# Patient Record
Sex: MALE | Race: WHITE | NOT HISPANIC OR LATINO | Employment: FULL TIME | URBAN - METROPOLITAN AREA
[De-identification: names, ages, dates, MRNs, and addresses within clinical notes are randomized per-mention and may not be internally consistent; named-entity substitution may affect disease eponyms.]

---

## 2019-02-19 ENCOUNTER — OFFICE VISIT (OUTPATIENT)
Dept: INTERNAL MEDICINE | Facility: CLINIC | Age: 54
End: 2019-02-19
Payer: COMMERCIAL

## 2019-02-19 VITALS
BODY MASS INDEX: 24.36 KG/M2 | WEIGHT: 174 LBS | SYSTOLIC BLOOD PRESSURE: 116 MMHG | OXYGEN SATURATION: 98 % | RESPIRATION RATE: 14 BRPM | HEART RATE: 70 BPM | DIASTOLIC BLOOD PRESSURE: 80 MMHG | HEIGHT: 71 IN | TEMPERATURE: 97.8 F

## 2019-02-19 DIAGNOSIS — Z00.00 ENCOUNTER FOR ROUTINE ADULT HEALTH EXAMINATION WITHOUT ABNORMAL FINDINGS: Primary | ICD-10-CM

## 2019-02-19 DIAGNOSIS — Z23 NEED FOR TDAP VACCINATION: ICD-10-CM

## 2019-02-19 DIAGNOSIS — Z12.5 SCREENING FOR PROSTATE CANCER: ICD-10-CM

## 2019-02-19 DIAGNOSIS — Z11.4 SCREENING FOR HIV (HUMAN IMMUNODEFICIENCY VIRUS): ICD-10-CM

## 2019-02-19 DIAGNOSIS — Z12.11 SCREEN FOR COLON CANCER: ICD-10-CM

## 2019-02-19 DIAGNOSIS — Z13.6 CARDIOVASCULAR SCREENING; LDL GOAL LESS THAN 160: ICD-10-CM

## 2019-02-19 PROCEDURE — 90715 TDAP VACCINE 7 YRS/> IM: CPT | Performed by: INTERNAL MEDICINE

## 2019-02-19 PROCEDURE — 90471 IMMUNIZATION ADMIN: CPT | Performed by: INTERNAL MEDICINE

## 2019-02-19 PROCEDURE — 99396 PREV VISIT EST AGE 40-64: CPT | Mod: 25 | Performed by: INTERNAL MEDICINE

## 2019-02-19 ASSESSMENT — MIFFLIN-ST. JEOR: SCORE: 1666.39

## 2019-02-19 NOTE — PATIENT INSTRUCTIONS
"  *  Tetanus vaccine updated today    *  I would recommend that you get the annual influenza vaccine every year.  You can get this anywhere, like most pharmacies.     *  Return for a \"lab only appointment\" at your convenience, sooner if needed.  Please get these labs done in a fasting state with nothing to eat for at least 8 hours prior to getting labs drawn.  I will make contact regarding the results and any recommendations once I have reviewed them.     Colon Cancer Screening:  ============================================================  *  All men and women are recommended to have some sort of formal colon cancer screening starting at age of 50 (or earlier if indicated based on family history of colon cancer.     *  Colon cancer is a preventable type of cancer that if caught early can be easily treated even before it becomes cancer by removing the precancerous.      *  Common Colon cancer screening options:     --Colonoscopy (every 10 years if normal exam, no family history of colon cancer)  I place order and you will be contacted to arrange this procedure.   Pros: most complete and thorough exam, identify and remove any pre-cancerous polyps.   Cons: prep before procedure, sedation required, possible cost     --ColoGuard Stool test every 3 years (be sure to confirm coverage by insurance).  This test checks for colon cancer specific DNA in the stool.  You will receive the collection kit in the mail.  Return the samples via mail.  If positive, you do not necessarily have colon cancer, but will need a colonoscopy.    Pros: easy to collect and return, decent specific screening test, newer test  Cons: cost     --\"FIT\" Stool test once per year.    Return the \"FIT\" stool card test to us via mail.  This is a basic level screening for colon cancer by detecting trace amount of occult blood in the intestinal tract.  If the FIT test shows any traces of occult blood, it does NOT necessarily mean that you have colon cancer, " "it just means that we should probably consider doing the colonoscopy.   Pros: easy to collect, cheap  Cons: not very specific, high false positive rate          Consider using the SuPrep colonoscopy prep to prepare the colon for the colonoscopy.  This requires much less liquid to be drunk to clean you out.  It may not be covered by insurance (approx $60-65), but it is much better tolerated and easier to use, therefore may be worth the extra expense.       Preventive Health Recommendations  Male Ages 50 - 64    Yearly exam:             See your health care provider every year in order to  o   Review health changes.   o   Discuss preventive care.    o   Review your medicines if your doctor has prescribed any.     Have a cholesterol test every 5 years, or more frequently if you are at risk for high cholesterol/heart disease.     Have a diabetes test (fasting glucose) every three years. If you are at risk for diabetes, you should have this test more often.     Have a colonoscopy at age 50, or have a yearly FIT test (stool test). These exams will check for colon cancer.      Talk with your health care provider about whether or not a prostate cancer screening test (PSA) is right for you.    You should be tested each year for STDs (sexually transmitted diseases), if you re at risk.     Shots: Get a flu shot each year. Get a tetanus shot every 10 years.     Nutrition:    Eat at least 5 servings of fruits and vegetables daily.     Eat whole-grain bread, whole-wheat pasta and brown rice instead of white grains and rice.     Get adequate Calcium and Vitamin D.        --Good Grains:  Oats, brown rice, Quinoa (these do not raise the blood sugar as much)     --Bad grains:  Anything made from wheat or white rice     (because these raise the blood sugars significantly, and the possible gluten issue from wheat for some people).      --Proteins:  Aim for \"lean proteins\" including chicken, fish, seafood, pork, turkey, and eggs (in " moderation); Eat red meat only occasionally      Lifestyle    Exercise for at least 150 minutes a week (30 minutes a day, 5 days a week). This will help you control your weight and prevent disease.     Limit alcohol to one drink per day.     No smoking.     Wear sunscreen to prevent skin cancer.     See your dentist every six months for an exam and cleaning.     See your eye doctor every 1 to 2 years.

## 2019-02-19 NOTE — PROGRESS NOTES
SUBJECTIVE:   CC: Gabriel Jones is an 53 year old male who presents for preventative health visit.     Physical   Annual:     Getting at least 3 servings of Calcium per day:  NO    Bi-annual eye exam:  Yes    Dental care twice a year:  Yes    Sleep apnea or symptoms of sleep apnea:  None    Diet:  Regular (no restrictions)    Frequency of exercise:  None    Taking medications regularly:  Yes    Medication side effects:  Not applicable    Additional concerns today:  No    PHQ-2 Total Score: 0              Today's PHQ-2 Score:   PHQ-2 ( 1999 Pfizer) 2/19/2019   Q1: Little interest or pleasure in doing things 0   Q2: Feeling down, depressed or hopeless 0   PHQ-2 Score 0   Q1: Little interest or pleasure in doing things Not at all   Q2: Feeling down, depressed or hopeless Not at all   PHQ-2 Score 0       Abuse: Current or Past(Physical, Sexual or Emotional)- No  Do you feel safe in your environment? Yes    Social History     Tobacco Use     Smoking status: Light Tobacco Smoker     Smokeless tobacco: Never Used   Substance Use Topics     Alcohol use: Yes     Alcohol Use 2/19/2019   If you drink alcohol do you typically have greater than 3 drinks per day OR greater than 7 drinks per week? No   No flowsheet data found.    Last PSA:   PSA   Date Value Ref Range Status   02/23/2019 1.32 0 - 4 ug/L Final     Comment:     Assay Method:  Chemiluminescence using Siemens Vista analyzer       Reviewed orders with patient. Reviewed health maintenance and updated orders accordingly - Yes      Reviewed and updated as needed this visit by clinical staff  Tobacco  Allergies  Meds  Problems  Med Hx  Surg Hx  Fam Hx  Soc Hx          Reviewed and updated as needed this visit by Provider  Tobacco  Allergies  Meds  Problems  Med Hx  Surg Hx  Fam Hx  Soc Hx           Past Medical History:  ---------------------------  Past Medical History:   Diagnosis Date     Kidney stones 1990, 1997    2 episodes kidney stones       Past  Surgical History:  ---------------------------  Past Surgical History:   Procedure Laterality Date     NO HISTORY OF SURGERY         Current Medications:  ---------------------------  No current outpatient medications on file.       Allergies:  -------------  Not on File    Social History:  -------------------  Social History     Socioeconomic History     Marital status: Single     Spouse name: Not on file     Number of children: Not on file     Years of education: Not on file     Highest education level: Not on file   Occupational History     Not on file   Social Needs     Financial resource strain: Not on file     Food insecurity:     Worry: Not on file     Inability: Not on file     Transportation needs:     Medical: Not on file     Non-medical: Not on file   Tobacco Use     Smoking status: Light Tobacco Smoker     Smokeless tobacco: Never Used   Substance and Sexual Activity     Alcohol use: Yes     Drug use: No     Sexual activity: Not Currently   Lifestyle     Physical activity:     Days per week: Not on file     Minutes per session: Not on file     Stress: Not on file   Relationships     Social connections:     Talks on phone: Not on file     Gets together: Not on file     Attends Mandaeism service: Not on file     Active member of club or organization: Not on file     Attends meetings of clubs or organizations: Not on file     Relationship status: Not on file     Intimate partner violence:     Fear of current or ex partner: Not on file     Emotionally abused: Not on file     Physically abused: Not on file     Forced sexual activity: Not on file   Other Topics Concern     Not on file   Social History Narrative     Not on file       Family Medical History:  ------------------------------  Family History   Problem Relation Age of Onset     Alzheimer Disease Mother 65         age 69     Coronary Artery Disease Early Onset Mother 59        CABG x 3     Hyperlipidemia Mother      Hyperlipidemia Father       "Hypertension Father      Cerebrovascular Disease Father 68        first stroke age 63;  age 78     Cancer Father 70        duodenal cancer     No Known Problems Sister      Crohn's Disease Brother 25     No Known Problems Sister      No Known Problems Sister         Review of Systems  CONSTITUTIONAL: NEGATIVE for fever, chills, change in weight  INTEGUMENTARY/SKIN: NEGATIVE for worrisome rashes, moles or lesions  EYES: NEGATIVE for vision changes or irritation  ENT: NEGATIVE for ear, mouth and throat problems  RESP: NEGATIVE for significant cough or SOB  CV: NEGATIVE for chest pain, palpitations or peripheral edema  GI: NEGATIVE for nausea, abdominal pain, heartburn, or change in bowel habits   male: negative for dysuria, hematuria, decreased urinary stream, erectile dysfunction, urethral discharge  MUSCULOSKELETAL: NEGATIVE for significant arthralgias or myalgia  NEURO: NEGATIVE for weakness, dizziness or paresthesias  PSYCHIATRIC: NEGATIVE for changes in mood or affect    OBJECTIVE:   /80   Pulse 70   Temp 97.8  F (36.6  C) (Oral)   Resp 14   Ht 1.803 m (5' 11\")   Wt 78.9 kg (174 lb)   SpO2 98%   BMI 24.27 kg/m      Physical Exam  GENERAL alert and no distress  EYES:  Normal sclera,conjunctiva, EOMI  HENT: oral and posterior pharynx without lesions or erythema, facies symmetric  NECK: Neck supple. No LAD, without thyroidmegaly or JVD., Carotids without bruits.  RESP: Clear to ausculation bilaterally without wheezes or crackles. Normal BS in all fields.  CV: RRR normal S1S2 without murmurs, rubs or gallops. PMI normal  LYMPH: no cervical lymph adenopathy appreciated  MS: extremities- no gross deformities of the visible extremities noted, no edema  PSYCH: Alert and oriented times 3; speech- coherent  SKIN:  No obvious significant skin lesions on visible portions of face         ASSESSMENT/PLAN:     (Z00.00) Encounter for routine adult health examination without abnormal findings  (primary " encounter diagnosis)  Comment: Discussed cardiac disease risk factor modification including screening for and treating HTN, lipids, DM, and smoking cessation.  Also discussed age appropriate cancer screening recommendations including testicular, prostate, colon and lung cancer as dictated by age group.  Recommended low fat, low salt diet and moderation in any alcohol intake.  Recommended always using seatbelts when in a car.  Recommended never driving after drinking or riding with someone who has been drinking as well.       Plan:     (Z12.11) Screen for colon cancer  Comment: Discussed current colon cancer screening recommendations calling for colonoscopy as gold standard for colon cancer screening.  Instructed them to check with their insurance coverage to see what is covered, and then referral given for colonoscopy.    If colonoscopy not covered or the patient does not want to do this study, then we can do a FIT test annually, but I told them that this is a much inferior screening method for colon cancer.    Plan: GASTROENTEROLOGY ADULT REF PROCEDURE ONLY         Tim Dominic [phone]; No Provider        Preference            (Z11.4) Screening for HIV (human immunodeficiency virus)  Comment:   Plan:     (Z12.5) Screening for prostate cancer  Comment: Discussed prostate cancer screening and PSA blood test.  Discussed that an elevated PSA blood test can be caused by things other than prostate cancer, including infection/inflammation, BPH, and recent sexual activity; and that elevated PSA blood test may require further investigation with a urologist   Plan: PSA, screen            (Z13.6) CARDIOVASCULAR SCREENING; LDL GOAL LESS THAN 160  Comment: Discussed cardiac disease risk factors and cardiac disease risk factor modification.   Plan: Lipid panel reflex to direct LDL Fasting, CBC         with platelets, Basic metabolic panel, HIV         Antigen Antibody Combo, PSA, screen            (Z23) Need for  "Tdap vaccination  Comment:   Plan: TDAP VACCINE (ADACEL)               COUNSELING:   Reviewed preventive health counseling, as reflected in patient instructions       Regular exercise       Healthy diet/nutrition       Vision screening       Hearing screening       Aspirin Prophylaxsis       Consider Hep C screening for patients born between 1945 and 1965       Colon cancer screening       Prostate cancer screening    BP Readings from Last 1 Encounters:   02/19/19 116/80     Estimated body mass index is 24.27 kg/m  as calculated from the following:    Height as of this encounter: 1.803 m (5' 11\").    Weight as of this encounter: 78.9 kg (174 lb).           reports that he has been smoking.  he has never used smokeless tobacco.      Counseling Resources:  ATP IV Guidelines  Pooled Cohorts Equation Calculator  FRAX Risk Assessment  ICSI Preventive Guidelines  Dietary Guidelines for Americans, 2010  USDA's MyPlate  ASA Prophylaxis  Lung CA Screening    Isai Guevara MD  [facility]  "

## 2019-02-23 DIAGNOSIS — Z12.5 SCREENING FOR PROSTATE CANCER: ICD-10-CM

## 2019-02-23 DIAGNOSIS — Z13.6 CARDIOVASCULAR SCREENING; LDL GOAL LESS THAN 160: ICD-10-CM

## 2019-02-23 LAB
ERYTHROCYTE [DISTWIDTH] IN BLOOD BY AUTOMATED COUNT: 12.9 % (ref 10–15)
HCT VFR BLD AUTO: 45.8 % (ref 40–53)
HGB BLD-MCNC: 15.9 G/DL (ref 13.3–17.7)
MCH RBC QN AUTO: 29.7 PG (ref 26.5–33)
MCHC RBC AUTO-ENTMCNC: 34.7 G/DL (ref 31.5–36.5)
MCV RBC AUTO: 85 FL (ref 78–100)
PLATELET # BLD AUTO: 193 10E9/L (ref 150–450)
RBC # BLD AUTO: 5.36 10E12/L (ref 4.4–5.9)
WBC # BLD AUTO: 8.5 10E9/L (ref 4–11)

## 2019-02-23 PROCEDURE — G0103 PSA SCREENING: HCPCS | Performed by: INTERNAL MEDICINE

## 2019-02-23 PROCEDURE — 80048 BASIC METABOLIC PNL TOTAL CA: CPT | Performed by: INTERNAL MEDICINE

## 2019-02-23 PROCEDURE — 80061 LIPID PANEL: CPT | Performed by: INTERNAL MEDICINE

## 2019-02-23 PROCEDURE — 87389 HIV-1 AG W/HIV-1&-2 AB AG IA: CPT | Performed by: INTERNAL MEDICINE

## 2019-02-23 PROCEDURE — 85027 COMPLETE CBC AUTOMATED: CPT | Performed by: INTERNAL MEDICINE

## 2019-02-23 PROCEDURE — 36415 COLL VENOUS BLD VENIPUNCTURE: CPT | Performed by: INTERNAL MEDICINE

## 2019-02-24 LAB
ANION GAP SERPL CALCULATED.3IONS-SCNC: 4 MMOL/L (ref 3–14)
BUN SERPL-MCNC: 11 MG/DL (ref 7–30)
CALCIUM SERPL-MCNC: 9.1 MG/DL (ref 8.5–10.1)
CHLORIDE SERPL-SCNC: 106 MMOL/L (ref 94–109)
CHOLEST SERPL-MCNC: 147 MG/DL
CO2 SERPL-SCNC: 28 MMOL/L (ref 20–32)
CREAT SERPL-MCNC: 0.96 MG/DL (ref 0.66–1.25)
GFR SERPL CREATININE-BSD FRML MDRD: >90 ML/MIN/{1.73_M2}
GLUCOSE SERPL-MCNC: 82 MG/DL (ref 70–99)
HDLC SERPL-MCNC: 42 MG/DL
LDLC SERPL CALC-MCNC: 90 MG/DL
NONHDLC SERPL-MCNC: 105 MG/DL
POTASSIUM SERPL-SCNC: 4.3 MMOL/L (ref 3.4–5.3)
PSA SERPL-ACNC: 1.32 UG/L (ref 0–4)
SODIUM SERPL-SCNC: 138 MMOL/L (ref 133–144)
TRIGL SERPL-MCNC: 75 MG/DL

## 2019-02-26 LAB — HIV 1+2 AB+HIV1 P24 AG SERPL QL IA: NONREACTIVE

## 2019-03-18 ENCOUNTER — HOSPITAL ENCOUNTER (OUTPATIENT)
Facility: CLINIC | Age: 54
Discharge: HOME | End: 2019-03-18
Attending: COLON & RECTAL SURGERY | Admitting: COLON & RECTAL SURGERY
Payer: COMMERCIAL

## 2019-03-18 VITALS
HEART RATE: 67 BPM | OXYGEN SATURATION: 95 % | RESPIRATION RATE: 12 BRPM | DIASTOLIC BLOOD PRESSURE: 81 MMHG | SYSTOLIC BLOOD PRESSURE: 117 MMHG

## 2019-03-18 LAB — COLONOSCOPY: NORMAL

## 2019-03-18 PROCEDURE — 88305 TISSUE EXAM BY PATHOLOGIST: CPT | Performed by: COLON & RECTAL SURGERY

## 2019-03-18 PROCEDURE — G0500 MOD SEDAT ENDO SERVICE >5YRS: HCPCS | Performed by: COLON & RECTAL SURGERY

## 2019-03-18 PROCEDURE — 25000128 H RX IP 250 OP 636: Performed by: COLON & RECTAL SURGERY

## 2019-03-18 PROCEDURE — 45385 COLONOSCOPY W/LESION REMOVAL: CPT | Mod: PT | Performed by: COLON & RECTAL SURGERY

## 2019-03-18 PROCEDURE — 88305 TISSUE EXAM BY PATHOLOGIST: CPT | Mod: 26 | Performed by: COLON & RECTAL SURGERY

## 2019-03-18 RX ORDER — LIDOCAINE 40 MG/G
CREAM TOPICAL
Status: DISCONTINUED | OUTPATIENT
Start: 2019-03-18 | End: 2019-03-18 | Stop reason: HOSPADM

## 2019-03-18 RX ORDER — FENTANYL CITRATE 50 UG/ML
INJECTION, SOLUTION INTRAMUSCULAR; INTRAVENOUS PRN
Status: DISCONTINUED | OUTPATIENT
Start: 2019-03-18 | End: 2019-03-18 | Stop reason: HOSPADM

## 2019-03-18 RX ORDER — ONDANSETRON 2 MG/ML
4 INJECTION INTRAMUSCULAR; INTRAVENOUS
Status: DISCONTINUED | OUTPATIENT
Start: 2019-03-18 | End: 2019-03-18 | Stop reason: HOSPADM

## 2019-03-18 NOTE — H&P
Colon & Rectal Surgery History and Physical  Pre-Endoscopy Procedure Note    History of Present Illness   I have been asked by Dr. Guevara to evaluate this 53 year old male for colorectal cancer screening. He currently denies any abdominal pain, weight loss, bleeding per rectum, or recent change in bowel habits.    Past Medical History  Diagnosis Date     Kidney stones 1990, 1997    2 episodes kidney stones       Past Surgical History  Procedure Laterality Date     GENITOURINARY SURGERY      kidney stone lithotripsy        Medications  No medications prior to admission.       Allergies   No Known Allergies     Family History   Family history includes Alzheimer Disease (age of onset: 65) in his mother; Cancer (age of onset: 70) in his father; Cerebrovascular Disease (age of onset: 68) in his father; Coronary Artery Disease Early Onset (age of onset: 59) in his mother; Crohn's Disease (age of onset: 25) in his brother; Hyperlipidemia in his father and mother; Hypertension in his father.    Social History   He reports that he has quit smoking. He has a 20.00 pack-year smoking history. he has never used smokeless tobacco. He reports that he drinks alcohol. He reports that he does not use drugs.    Review of Systems   Constitutional:  No fever, weight change or fatigue.    Eyes:     No dry eyes or vision changes.   Ears/Nose/Throat/Neck:  No oral ulcers, sore throat or voice change.    Cardiovascular:   No palpitations, syncope, angina or edema.   Respiratory:    No chest pain, excessive sleepiness, shortness of breath or hemoptysis.    Gastrointestinal:   No abdominal pain, nausea, vomiting, diarrhea or heartburn.    Genitourinary:   No dysuria, hematuria, urinary retention or urinary frequency.   Musculoskeletal:  No joint swelling or arthralgias.    Dermatologic:  No skin rash or other skin changes.   Neurologic:    No focal weakness or numbness. No neuropathy.   Psychiatric:    No depression, anxiety, suicidal  ideation, or paranoid ideation.   Endocrine:   No cold or heat intolerance, polydipsia, hirsutism, change in libido, or flushing.   Hematology/Lymphatic:  No bleeding or lymphadenopathy.    Allergy/Immunology:  No rhinitis or hives.     Physical Exam   Vitals:  /82, HR 67, RR 16, SpO2 100 %.    General:  Alert and oriented to person, place and time   Airway: Normal oropharyngeal airway and neck mobility   Lungs:  Clear bilaterally   Heart:  Regular rate and rhythm   Abdomen: Soft, NT, ND, no masses   Rectal:  Perianal skin without excoriation, hemorrhoidal disease or anal fissure        Digital rectal examination reveals normal sphincter tone without masses    ASA Grade: I (normal healthy patient)      Impression: Cleared for use of conscious sedation for colorectal cancer screening    Plan: Proceed with colonoscopy     Diana Terrell MD  [facility]  [phone]

## 2019-03-19 LAB — COPATH REPORT: NORMAL

## 2019-11-03 ENCOUNTER — HEALTH MAINTENANCE LETTER (OUTPATIENT)
Age: 54
End: 2019-11-03

## 2020-11-16 ENCOUNTER — HEALTH MAINTENANCE LETTER (OUTPATIENT)
Age: 55
End: 2020-11-16

## 2021-09-18 ENCOUNTER — HEALTH MAINTENANCE LETTER (OUTPATIENT)
Age: 56
End: 2021-09-18

## 2022-01-08 ENCOUNTER — HEALTH MAINTENANCE LETTER (OUTPATIENT)
Age: 57
End: 2022-01-08

## 2022-03-23 ENCOUNTER — OFFICE VISIT (OUTPATIENT)
Dept: INTERNAL MEDICINE | Facility: CLINIC | Age: 57
End: 2022-03-23
Payer: COMMERCIAL

## 2022-03-23 VITALS
HEIGHT: 70 IN | TEMPERATURE: 96.9 F | DIASTOLIC BLOOD PRESSURE: 84 MMHG | SYSTOLIC BLOOD PRESSURE: 138 MMHG | BODY MASS INDEX: 24.65 KG/M2 | HEART RATE: 59 BPM | OXYGEN SATURATION: 99 % | WEIGHT: 172.2 LBS | RESPIRATION RATE: 14 BRPM

## 2022-03-23 DIAGNOSIS — Z11.59 NEED FOR HEPATITIS C SCREENING TEST: ICD-10-CM

## 2022-03-23 DIAGNOSIS — Z12.5 SCREENING FOR PROSTATE CANCER: ICD-10-CM

## 2022-03-23 DIAGNOSIS — Z13.6 CARDIOVASCULAR SCREENING; LDL GOAL LESS THAN 130: ICD-10-CM

## 2022-03-23 DIAGNOSIS — Z23 NEED FOR SHINGLES VACCINE: ICD-10-CM

## 2022-03-23 DIAGNOSIS — Z00.00 ENCOUNTER FOR ROUTINE ADULT HEALTH EXAMINATION WITHOUT ABNORMAL FINDINGS: Primary | ICD-10-CM

## 2022-03-23 DIAGNOSIS — R73.09 ELEVATED GLUCOSE: ICD-10-CM

## 2022-03-23 LAB
ANION GAP SERPL CALCULATED.3IONS-SCNC: 5 MMOL/L (ref 3–14)
BUN SERPL-MCNC: 9 MG/DL (ref 7–30)
CALCIUM SERPL-MCNC: 9.3 MG/DL (ref 8.5–10.1)
CHLORIDE BLD-SCNC: 109 MMOL/L (ref 94–109)
CHOLEST SERPL-MCNC: 135 MG/DL
CO2 SERPL-SCNC: 27 MMOL/L (ref 20–32)
CREAT SERPL-MCNC: 0.95 MG/DL (ref 0.66–1.25)
ERYTHROCYTE [DISTWIDTH] IN BLOOD BY AUTOMATED COUNT: 13 % (ref 10–15)
FASTING STATUS PATIENT QL REPORTED: NO
GFR SERPL CREATININE-BSD FRML MDRD: >90 ML/MIN/1.73M2
GLUCOSE BLD-MCNC: 102 MG/DL (ref 70–99)
HBA1C MFR BLD: 5.1 % (ref 0–5.6)
HCT VFR BLD AUTO: 46.9 % (ref 40–53)
HCV AB SERPL QL IA: NONREACTIVE
HDLC SERPL-MCNC: 51 MG/DL
HGB BLD-MCNC: 15.7 G/DL (ref 13.3–17.7)
LDLC SERPL CALC-MCNC: 63 MG/DL
MCH RBC QN AUTO: 29.5 PG (ref 26.5–33)
MCHC RBC AUTO-ENTMCNC: 33.5 G/DL (ref 31.5–36.5)
MCV RBC AUTO: 88 FL (ref 78–100)
NONHDLC SERPL-MCNC: 84 MG/DL
PLATELET # BLD AUTO: 194 10E3/UL (ref 150–450)
POTASSIUM BLD-SCNC: 4.1 MMOL/L (ref 3.4–5.3)
PSA SERPL-MCNC: 0.96 UG/L (ref 0–4)
RBC # BLD AUTO: 5.32 10E6/UL (ref 4.4–5.9)
SODIUM SERPL-SCNC: 141 MMOL/L (ref 133–144)
TRIGL SERPL-MCNC: 107 MG/DL
WBC # BLD AUTO: 8.3 10E3/UL (ref 4–11)

## 2022-03-23 PROCEDURE — 80061 LIPID PANEL: CPT | Performed by: INTERNAL MEDICINE

## 2022-03-23 PROCEDURE — 86803 HEPATITIS C AB TEST: CPT | Performed by: INTERNAL MEDICINE

## 2022-03-23 PROCEDURE — 36415 COLL VENOUS BLD VENIPUNCTURE: CPT | Performed by: INTERNAL MEDICINE

## 2022-03-23 PROCEDURE — 99386 PREV VISIT NEW AGE 40-64: CPT | Mod: 25 | Performed by: INTERNAL MEDICINE

## 2022-03-23 PROCEDURE — 90750 HZV VACC RECOMBINANT IM: CPT | Performed by: INTERNAL MEDICINE

## 2022-03-23 PROCEDURE — 85027 COMPLETE CBC AUTOMATED: CPT | Performed by: INTERNAL MEDICINE

## 2022-03-23 PROCEDURE — 90471 IMMUNIZATION ADMIN: CPT | Performed by: INTERNAL MEDICINE

## 2022-03-23 PROCEDURE — 80048 BASIC METABOLIC PNL TOTAL CA: CPT | Performed by: INTERNAL MEDICINE

## 2022-03-23 PROCEDURE — 83036 HEMOGLOBIN GLYCOSYLATED A1C: CPT | Performed by: INTERNAL MEDICINE

## 2022-03-23 PROCEDURE — G0103 PSA SCREENING: HCPCS | Performed by: INTERNAL MEDICINE

## 2022-03-23 ASSESSMENT — ENCOUNTER SYMPTOMS
DIARRHEA: 0
MYALGIAS: 0
DYSURIA: 0
HEMATURIA: 0
DIZZINESS: 0
FREQUENCY: 0
SHORTNESS OF BREATH: 0
SORE THROAT: 0
CONSTIPATION: 0
EYE PAIN: 0
WEAKNESS: 0
PARESTHESIAS: 0
COUGH: 0
NAUSEA: 0
HEMATOCHEZIA: 0
HEADACHES: 0
PALPITATIONS: 0
ABDOMINAL PAIN: 0
ARTHRALGIAS: 0
JOINT SWELLING: 0
HEARTBURN: 0
NERVOUS/ANXIOUS: 0
CHILLS: 0
FEVER: 0

## 2022-03-23 NOTE — NURSING NOTE
Prior to immunization administration, verified patients identity using patient s name and date of birth. Please see Immunization Activity for additional information.     Screening Questionnaire for Adult Immunization    Are you sick today?   No   Do you have allergies to medications, food, a vaccine component or latex?   No   Have you ever had a serious reaction after receiving a vaccination?   No   Do you have a long-term health problem with heart, lung, kidney, or metabolic disease (e.g., diabetes), asthma, a blood disorder, no spleen, complement component deficiency, a cochlear implant, or a spinal fluid leak?  Are you on long-term aspirin therapy?   No   Do you have cancer, leukemia, HIV/AIDS, or any other immune system problem?   No   Do you have a parent, brother, or sister with an immune system problem?   No   In the past 3 months, have you taken medications that affect  your immune system, such as prednisone, other steroids, or anticancer drugs; drugs for the treatment of rheumatoid arthritis, Crohn s disease, or psoriasis; or have you had radiation treatments?   No   Have you had a seizure, or a brain or other nervous system problem?   No   During the past year, have you received a transfusion of blood or blood    products, or been given immune (gamma) globulin or antiviral drug?   No   For women: Are you pregnant or is there a chance you could become       pregnant during the next month?   No   Have you received any vaccinations in the past 4 weeks?   No     Immunization questionnaire answers were all negative.        Per orders of Dr. Guevara, injection of Shingrix given by Asia Vazquez. Patient instructed to remain in clinic for 15 minutes afterwards, and to report any adverse reaction to me immediately.       Screening performed by Asia Vazquez on 3/23/2022 at 10:40 AM.

## 2022-03-23 NOTE — PROGRESS NOTES
SUBJECTIVE:   CC: Gabriel Jones is an 56 year old male who presents for preventative health visit.   Patient has been advised of split billing requirements and indicates understanding: Yes  Healthy Habits:     Getting at least 3 servings of Calcium per day:  Yes    Bi-annual eye exam:  Yes    Dental care twice a year:  Yes    Sleep apnea or symptoms of sleep apnea:  None    Diet:  Regular (no restrictions)    Frequency of exercise:  None    Taking medications regularly:  Yes    Medication side effects:  Not applicable    PHQ-2 Total Score: 0    Additional concerns today:  Yes      PROBLEMS TO ADD ON...  1. Intermittent heaviness in chest in left side of chest with laying on left side   2. Biometric screening form     Today's PHQ-2 Score:   PHQ-2 ( 1999 [employer]) 3/23/2022   Q1: Little interest or pleasure in doing things 0   Q2: Feeling down, depressed or hopeless 0   PHQ-2 Score 0   PHQ-2 Total Score (12-17 Years)- Positive if 3 or more points; Administer PHQ-A if positive -   Q1: Little interest or pleasure in doing things Not at all   Q2: Feeling down, depressed or hopeless Not at all   PHQ-2 Score 0       Abuse: Current or Past(Physical, Sexual or Emotional)- No  Do you feel safe in your environment? Yes    Have you ever done Advance Care Planning? (For example, a Health Directive, POLST, or a discussion with a medical provider or your loved ones about your wishes): No, advance care planning information given to patient to review.  Patient declined advance care planning discussion at this time.    Social History     Tobacco Use     Smoking status: Former Smoker     Packs/day: 1.00     Years: 15.00     Pack years: 15.00     Smokeless tobacco: Never Used   Substance Use Topics     Alcohol use: Yes     Comment: 3-4 beers/week     If you drink alcohol do you typically have >3 drinks per day or >7 drinks per week? No    Alcohol Use 3/23/2022   Prescreen: >3 drinks/day or >7 drinks/week? No   Prescreen: >3 drinks/day or  >7 drinks/week? -       Last PSA:   PSA   Date Value Ref Range Status   02/23/2019 1.32 0 - 4 ug/L Final     Comment:     Assay Method:  Chemiluminescence using Siemens Vista analyzer     Prostate Specific Antigen Screen   Date Value Ref Range Status   03/23/2022 0.96 0.00 - 4.00 ug/L Final       Reviewed orders with patient. Reviewed health maintenance and updated orders accordingly - Yes      Reviewed and updated as needed this visit by clinical staff   Tobacco  Allergies  Meds   Med Hx  Surg Hx  Fam Hx  Soc Hx        Reviewed and updated as needed this visit by Provider   Tobacco       Fam Hx           **I reviewed the information recorded in the patient's EPIC chart (including but not limited to medical history, surgical history, family history, problem list, medication list, and allergy list) and updated the information as indicated based on the patients reported information.         Review of Systems   Constitutional: Negative for chills and fever.   HENT: Negative for congestion, ear pain, hearing loss and sore throat.    Eyes: Negative for pain and visual disturbance.   Respiratory: Negative for cough and shortness of breath.    Cardiovascular: Negative for chest pain, palpitations and peripheral edema.   Gastrointestinal: Negative for abdominal pain, constipation, diarrhea, heartburn, hematochezia and nausea.   Genitourinary: Negative for dysuria, frequency, genital sores, hematuria, impotence, penile discharge and urgency.   Musculoskeletal: Negative for arthralgias, joint swelling and myalgias.   Skin: Negative for rash.   Neurological: Negative for dizziness, weakness, headaches and paresthesias.   Psychiatric/Behavioral: Negative for mood changes. The patient is not nervous/anxious.      CONSTITUTIONAL: NEGATIVE for fever, chills, change in weight  INTEGUMENTARY/SKIN: NEGATIVE for worrisome rashes, moles or lesions  EYES: NEGATIVE for vision changes or irritation  ENT: NEGATIVE for ear, mouth  "and throat problems  RESP: NEGATIVE for significant cough or SOB  CV: NEGATIVE for chest pain, palpitations or peripheral edema  GI: NEGATIVE for nausea, abdominal pain, heartburn, or change in bowel habits   male: negative for dysuria, hematuria, decreased urinary stream, erectile dysfunction, urethral discharge  MUSCULOSKELETAL: NEGATIVE for significant arthralgias or myalgia  NEURO: NEGATIVE for weakness, dizziness or paresthesias  PSYCHIATRIC: NEGATIVE for changes in mood or affect    OBJECTIVE:   /84   Pulse 59   Temp 96.9  F (36.1  C) (Temporal)   Resp 14   Ht 1.772 m (5' 9.75\")   Wt 78.1 kg (172 lb 3.2 oz)   SpO2 99%   BMI 24.89 kg/m      Physical Exam  GENERAL alert and no distress  EYES:  Normal sclera,conjunctiva, EOMI  HENT: oral and posterior pharynx without lesions or erythema, facies symmetric  NECK: Neck supple. No LAD, without thyroidmegaly.  RESP: Clear to ausculation bilaterally without wheezes or crackles. Normal BS in all fields.  CV: RRR normal S1S2 without murmurs, rubs or gallops.  LYMPH: no cervical lymph adenopathy appreciated  MS: extremities- no gross deformities of the visible extremities noted,   EXT:  no lower extremity edema  PSYCH: Alert and oriented times 3; speech- coherent  SKIN:  No obvious significant skin lesions on visible portions of face     Diagnostic Test Results:  Labs reviewed in Epic    Results for orders placed or performed in visit on 03/23/22   Lipid panel reflex to direct LDL Fasting     Status: None   Result Value Ref Range    Cholesterol 135 <200 mg/dL    Triglycerides 107 <150 mg/dL    Direct Measure HDL 51 >=40 mg/dL    LDL Cholesterol Calculated 63 <=100 mg/dL    Non HDL Cholesterol 84 <130 mg/dL    Patient Fasting > 8hrs? No     Narrative    Cholesterol  Desirable:  <200 mg/dL    Triglycerides  Normal:  Less than 150 mg/dL  Borderline High:  150-199 mg/dL  High:  200-499 mg/dL  Very High:  Greater than or equal to 500 mg/dL    Direct Measure " HDL  Female:  Greater than or equal to 50 mg/dL   Male:  Greater than or equal to 40 mg/dL    LDL Cholesterol  Desirable:  <100mg/dL  Above Desirable:  100-129 mg/dL   Borderline High:  130-159 mg/dL   High:  160-189 mg/dL   Very High:  >= 190 mg/dL    Non HDL Cholesterol  Desirable:  130 mg/dL  Above Desirable:  130-159 mg/dL  Borderline High:  160-189 mg/dL  High:  190-219 mg/dL  Very High:  Greater than or equal to 220 mg/dL   CBC with platelets     Status: Normal   Result Value Ref Range    WBC Count 8.3 4.0 - 11.0 10e3/uL    RBC Count 5.32 4.40 - 5.90 10e6/uL    Hemoglobin 15.7 13.3 - 17.7 g/dL    Hematocrit 46.9 40.0 - 53.0 %    MCV 88 78 - 100 fL    MCH 29.5 26.5 - 33.0 pg    MCHC 33.5 31.5 - 36.5 g/dL    RDW 13.0 10.0 - 15.0 %    Platelet Count 194 150 - 450 10e3/uL   Basic metabolic panel     Status: Abnormal   Result Value Ref Range    Sodium 141 133 - 144 mmol/L    Potassium 4.1 3.4 - 5.3 mmol/L    Chloride 109 94 - 109 mmol/L    Carbon Dioxide (CO2) 27 20 - 32 mmol/L    Anion Gap 5 3 - 14 mmol/L    Urea Nitrogen 9 7 - 30 mg/dL    Creatinine 0.95 0.66 - 1.25 mg/dL    Calcium 9.3 8.5 - 10.1 mg/dL    Glucose 102 (H) 70 - 99 mg/dL    GFR Estimate >90 >60 mL/min/1.73m2   PSA, screen     Status: Normal   Result Value Ref Range    Prostate Specific Antigen Screen 0.96 0.00 - 4.00 ug/L         ASSESSMENT/PLAN:     (Z00.00) Encounter for routine adult health examination without abnormal findings  (primary encounter diagnosis)  Comment: Discussed cardiac disease risk factor modification including screening, preventing, and treating hypertension, elevated lipids, diabetes, and smoking cessation.    Discussed age appropriate cancer screening recommendations as dictated by age group and past medical history.    Recommended making better food choices as often as possible, including lower carb, lower fat, lower salt diet and moderation in any alcohol intake.    Recommended maintaining regular physical activity/exercise  throughout their lifetime.  Recommended safety and injury prevention (i.e. seatbelt use, safety equipment like helmets when biking, etc).       **Completed his biometric screening form for work and returned to the fax number listed on the sheet.  He has bonus dollars in his health savings account if he completes these tasks.     Plan: REVIEW OF HEALTH MAINTENANCE PROTOCOL ORDERS            (Z11.59) Need for hepatitis C screening test  Comment: One time screening test per CDC recommendations.   Plan: REVIEW OF HEALTH MAINTENANCE PROTOCOL ORDERS,         Hepatitis C Screen Reflex to HCV RNA Quant and         Genotype            (Z13.6) CARDIOVASCULAR SCREENING; LDL GOAL LESS THAN 130  Comment: Discussed cardiac disease risk factors and cardiac disease risk factor modification.   Plan: REVIEW OF HEALTH MAINTENANCE PROTOCOL ORDERS,         Lipid panel reflex to direct LDL Fasting, CBC         with platelets, Basic metabolic panel            (Z12.5) Screening for prostate cancer  Comment: Discussed prostate cancer screening and PSA blood test.  Discussed that an elevated PSA blood test can be caused by things other than prostate cancer, including infection/inflammation, BPH, and recent sexual activity; and that elevated PSA blood test may require further investigation with a urologist   Plan: PSA, screen            (Z23) Need for shingles vaccine  Comment:   Plan: ZOSTER VACCINE RECOMBINANT ADJUVANTED IM NJX               Patient has been advised of split billing requirements and indicates understanding: Yes    COUNSELING:   Reviewed preventive health counseling, as reflected in patient instructions       Regular exercise       Healthy diet/nutrition       Vision screening       Immunizations    Received first dose of Shingrix shingles vaccine today, get the second dose in 2 to 6 months    up to date     Covid vaccine booster up-to-date.        Estimated body mass index is 24.89 kg/m  as calculated from the following:     "Height as of this encounter: 1.772 m (5' 9.75\").    Weight as of this encounter: 78.1 kg (172 lb 3.2 oz).         He reports that he has quit smoking. He has a 15.00 pack-year smoking history. He has never used smokeless tobacco.      Counseling Resources:  ATP IV Guidelines  Pooled Cohorts Equation Calculator  FRAX Risk Assessment  ICSI Preventive Guidelines  Dietary Guidelines for Americans, 2010  USDA's MyPlate  ASA Prophylaxis  Lung CA Screening    Isai Guevara MD  [facility]  "

## 2022-03-23 NOTE — PATIENT INSTRUCTIONS
"*  colonsocopy next due 2024    *  First shingrix shingles vaccines given today, get the secodn one from our pharmacy in 2-6 months      Shingles Vaccine (SHINGRIX):        I would recommend that you consider getting the Shingrix shingles vaccine.  The shingles vaccine is recommended for anyone over age 50.       The Shingrix vaccine is a series of 2 vaccines given 2-6 months apart.       The shingles vaccine does not stop you from getting shingles, but it decreases the intensity of the event, the duration of the event, and decreases the painful nerve condition that results       Based on the available data, the Shingrix vaccine has superior benefit and should be considered even if you have had the old Zostavax shinglesvaccine before.        Contact your insurance provider and ask them if either shingles vaccine is covered and is so, how much it will cost you.  Usually this will be cheaper to get in a pharmacy given by the pharmacist.      Regardless of the coverage, I would recommend that you consider the vaccine since shingles is very painful, (just ask anyone who has ever had it)      For Medicare insurance, the vaccine is cheaper to receive from a pharmacist in a pharmacy than for us to give you in the clinic.        5 GOALS TO PREVENT VASCULAR DISEASE:     1.  Aggressive blood pressure control, under 130/80 ideally.  Using medications if needed.    Your blood pressure is under good control    BP Readings from Last 4 Encounters:   03/23/22 138/84   03/18/19 117/81   02/19/19 116/80       2.  Aggressive LDL cholesterol (\"bad cholesterol\") lowering as indicated.    Your goal is an LDL under 130 for sure, preferably under 100.  (If you have diabetes or previous vascular disease, the the LDL goals would be under 100 for sure, preferably under 70.)    New guidelines identify four high-risk groups who could benefit from statins:   *people with pre-existing heart disease, such as those who have had a heart attack; "   *people ages 40 to 75 who have diabetes of any type  *patients ages 40 to 75 with at least a 7.5% risk of developing cardiovascular disease over the next decade, according to a formula described in the guidelines  *patients with the sort of super-high cholesterol that sometimes runs in families, as evidenced by an LDL of 190 milligrams per deciliter or higher    Your cholesterol levels are well controlled.    Recent Labs   Lab Test 02/23/19  1031   CHOL 147   HDL 42   LDL 90   TRIG 75       3.  Aggressive diabetic prevention, screening and/or management.      You do not have diabetes as of the most recent blood tests.     4.  No smoking    5.  Consider daily preventative aspirin over age 50 if you have enough cardiac risk factors to place you at higher risk for the presence of vascular disease.    If you have any reason not to take aspirin such easy bruising or bleeding, stomach problems, other anticoagulant medications, or any other side effects, then you should not take Aspirin.     --Based on your current cardiac disease risk profile and/or age over 75, you do NOT need to take daily preventative aspirin.          Preventive Health Recommendations  Male Ages 50 - 64    Yearly exam:             See your health care provider every year in order to  o   Review health changes.   o   Discuss preventive care.    o   Review your medicines if your doctor has prescribed any.     Have a cholesterol test every 5 years, or more frequently if you are at risk for high cholesterol/heart disease.     Have a diabetes test (fasting glucose) every three years. If you are at risk for diabetes, you should have this test more often.     Have a colonoscopy at age 50, or have a yearly FIT test (stool test). These exams will check for colon cancer.      Talk with your health care provider about whether or not a prostate cancer screening test (PSA) is right for you.    You should be tested each year for STDs (sexually transmitted  "diseases), if you re at risk.     Shots: Get a flu shot each year. Get a tetanus shot every 10 years.     Nutrition:    Eat at least 5 servings of fruits and vegetables daily.     Eat whole-grain bread, whole-wheat pasta and brown rice instead of white grains and rice.     Talk to your provider about Calcium and Vitamin D.        --Good Grains:  Oats, brown rice, Quinoa (these do not raise the blood sugar as much)     --Bad grains:  Anything made from wheat or white rice     (because these raise the blood sugars significantly, and the possible gluten issue from wheat for some people).      --Proteins:  Aim for \"lean proteins\" including chicken, fish, seafood, pork, turkey, and eggs (in moderation); Eat red meat only occasionally    Lifestyle    Exercise for at least 150 minutes a week (30 minutes a day, 5 days a week). This will help you control your weight and prevent disease.     Limit alcohol to one drink per day.     No smoking.     Wear sunscreen to prevent skin cancer.     See your dentist every six months for an exam and cleaning.     See your eye doctor every 1 to 2 years.              "

## 2022-06-15 ENCOUNTER — ALLIED HEALTH/NURSE VISIT (OUTPATIENT)
Dept: INTERNAL MEDICINE | Facility: CLINIC | Age: 57
End: 2022-06-15
Payer: COMMERCIAL

## 2022-06-15 DIAGNOSIS — Z23 NEED FOR VACCINATION: Primary | ICD-10-CM

## 2022-06-15 PROCEDURE — 90750 HZV VACC RECOMBINANT IM: CPT

## 2022-06-15 PROCEDURE — 90471 IMMUNIZATION ADMIN: CPT

## 2022-06-15 NOTE — PROGRESS NOTES
Prior to immunization administration, verified patients identity using patient s name and date of birth. Please see Immunization Activity for additional information.     Screening Questionnaire for Adult Immunization    Are you sick today?   No   Do you have allergies to medications, food, a vaccine component or latex?   No   Have you ever had a serious reaction after receiving a vaccination?   No   Do you have a long-term health problem with heart, lung, kidney, or metabolic disease (e.g., diabetes), asthma, a blood disorder, no spleen, complement component deficiency, a cochlear implant, or a spinal fluid leak?  Are you on long-term aspirin therapy?   No   Do you have cancer, leukemia, HIV/AIDS, or any other immune system problem?   No   Do you have a parent, brother, or sister with an immune system problem?   No   In the past 3 months, have you taken medications that affect  your immune system, such as prednisone, other steroids, or anticancer drugs; drugs for the treatment of rheumatoid arthritis, Crohn s disease, or psoriasis; or have you had radiation treatments?   No   Have you had a seizure, or a brain or other nervous system problem?   No   During the past year, have you received a transfusion of blood or blood    products, or been given immune (gamma) globulin or antiviral drug?   No   For women: Are you pregnant or is there a chance you could become       pregnant during the next month?   No   Have you received any vaccinations in the past 4 weeks?   No     Immunization questionnaire answers were all negative.        Per orders of Dr. Guevara, injection of Shingrix given by Aicha Shah CMA. Patient instructed to remain in clinic for 15 minutes afterwards, and to report any adverse reaction to me immediately.       Screening performed by Aicha Shah CMA on 6/15/2022 at 2:06 PM.

## 2022-11-19 ENCOUNTER — HEALTH MAINTENANCE LETTER (OUTPATIENT)
Age: 57
End: 2022-11-19

## 2023-04-20 ENCOUNTER — PATIENT OUTREACH (OUTPATIENT)
Dept: CARE COORDINATION | Facility: CLINIC | Age: 58
End: 2023-04-20
Payer: COMMERCIAL

## 2023-06-01 ENCOUNTER — HEALTH MAINTENANCE LETTER (OUTPATIENT)
Age: 58
End: 2023-06-01

## 2023-07-01 ENCOUNTER — OFFICE VISIT (OUTPATIENT)
Dept: URGENT CARE | Facility: URGENT CARE | Age: 58
End: 2023-07-01
Payer: COMMERCIAL

## 2023-07-01 ENCOUNTER — HOSPITAL ENCOUNTER (OUTPATIENT)
Dept: ULTRASOUND IMAGING | Facility: CLINIC | Age: 58
Discharge: HOME | End: 2023-07-01
Attending: FAMILY MEDICINE | Admitting: FAMILY MEDICINE
Payer: COMMERCIAL

## 2023-07-01 VITALS
RESPIRATION RATE: 18 BRPM | OXYGEN SATURATION: 98 % | BODY MASS INDEX: 25 KG/M2 | DIASTOLIC BLOOD PRESSURE: 89 MMHG | WEIGHT: 173 LBS | HEART RATE: 58 BPM | SYSTOLIC BLOOD PRESSURE: 145 MMHG | TEMPERATURE: 98.7 F

## 2023-07-01 DIAGNOSIS — S83.92XA SPRAIN OF LEFT LOWER LEG, INITIAL ENCOUNTER: ICD-10-CM

## 2023-07-01 DIAGNOSIS — M79.662 PAIN OF LEFT CALF: Primary | ICD-10-CM

## 2023-07-01 PROCEDURE — 99213 OFFICE O/P EST LOW 20 MIN: CPT | Performed by: FAMILY MEDICINE

## 2023-07-01 PROCEDURE — 93971 EXTREMITY STUDY: CPT | Mod: LT

## 2023-07-01 NOTE — PROGRESS NOTES
Chief Complaint   Patient presents with     Musculoskeletal Problem     Possible tear left calf muscle.    Gabriel was seen today for musculoskeletal problem.    Diagnoses and all orders for this visit:    Pain of left calf  -     US Lower Extremity Venous Duplex Left    Sprain of left lower leg, initial encounter      Results for orders placed or performed in visit on 07/01/23   US Lower Extremity Venous Duplex Left     Status: None    Narrative    EXAM: US LOWER EXTREMITY VENOUS DUPLEX LEFT  LOCATION: [facility]  DATE: 7/1/2023    INDICATION: Left medial calf pain.  COMPARISON: None.  TECHNIQUE: Venous Duplex ultrasound of the left lower extremity with and without compression, augmentation and duplex. Color flow and spectral Doppler with waveform analysis performed.    FINDINGS: Exam includes the common femoral, femoral, popliteal, and contralateral common femoral veins as well as segmentally visualized deep calf veins and greater saphenous vein.     LEFT: No deep vein thrombosis. No superficial thrombophlebitis. No popliteal cyst.      Impression    IMPRESSION:  1.  No deep venous thrombosis in the left lower extremity.       Differential diagnosis leg sprain, tendinitis, muscle tear, DVT,    Gabriel Jones is a 57 year old male who presents for evaluation of leg symptoms.  The workup  indicates this is no deep venous thrombosis.  This was discussed with the patient.  There are no symptoms to suggest this has progressed to pulmonary embolism.  Visit patient to consider doing ice packs to the area rest, ibuprofen for the next couple of days if symptoms do not get better over the next 2 days should follow-up.  Also discussed with patient if there is any worsening swelling, worsening redness or deformity noted should follow-up for further relation and treatment..     Gabriel Jones is a 57 year old male who presents with a chief complaint of left calf pain  pain, tenderness and decreased range of  motion.  Symptoms began 1 day(s) ago, are moderate and sudden onset  Context:  Injury:Yes: .  He was working in the garage when he got startled suddenly when his wife open the garage door and turn around and felt a shooting pain in the medial aspect of the left calf denies any recent long car ride or plane ride has been having difficulty plantar flexion Pain exacerbated by walking, weight-bearing and flexion/extension Relieved by rest and ice.  He treated it initially with Ibuprofen. This is the first time this type of injury has occurred to this patient.     Past Medical History:   Diagnosis Date     Kidney stones 1990, 1997    2 episodes kidney stones     No current outpatient medications on file.     Social History     Tobacco Use     Smoking status: Former     Packs/day: 1.00     Years: 15.00     Pack years: 15.00     Types: Cigarettes     Smokeless tobacco: Never   Substance Use Topics     Alcohol use: Yes     Comment: 3-4 beers/week       ROS:  Review of systems negative except as stated below    EXAM:   BP (!) 145/89 (BP Location: Right arm, Patient Position: Sitting, Cuff Size: Adult Regular)   Pulse 58   Temp 98.7  F (37.1  C) (Oral)   Resp 18   Wt 78.5 kg (173 lb)   SpO2 98%   BMI 25.00 kg/m    M/S Exam:left calf there is tenderness noted over the medial aspect of the calf no change in the girth between the 2 calf areas no redness or warmth noted   NEURO: Normal strength and tone, sensory exam grossly normal, mentation intact and speech normal      X-RAY was not done.    ASSESSMENT:     Encounter Diagnosis   Name Primary?     Pain of left calf Yes       PLAN:  See orders in epic      Katherine Houser MD

## 2023-09-04 ASSESSMENT — ENCOUNTER SYMPTOMS
EYE PAIN: 0
DIZZINESS: 0
CONSTIPATION: 0
PALPITATIONS: 0
MYALGIAS: 0
ARTHRALGIAS: 0
NAUSEA: 0
HEADACHES: 0
HEARTBURN: 0
DYSURIA: 0
CHILLS: 0
COUGH: 0
FEVER: 0
PARESTHESIAS: 0
SHORTNESS OF BREATH: 0
HEMATURIA: 0
JOINT SWELLING: 0
DIARRHEA: 0
WEAKNESS: 0
FREQUENCY: 0
NERVOUS/ANXIOUS: 0
ABDOMINAL PAIN: 0
HEMATOCHEZIA: 0
SORE THROAT: 0

## 2023-09-05 ENCOUNTER — OFFICE VISIT (OUTPATIENT)
Dept: INTERNAL MEDICINE | Facility: CLINIC | Age: 58
End: 2023-09-05
Payer: COMMERCIAL

## 2023-09-05 VITALS
HEART RATE: 54 BPM | DIASTOLIC BLOOD PRESSURE: 82 MMHG | TEMPERATURE: 98.2 F | SYSTOLIC BLOOD PRESSURE: 138 MMHG | HEIGHT: 71 IN | WEIGHT: 172.1 LBS | BODY MASS INDEX: 24.09 KG/M2 | OXYGEN SATURATION: 99 %

## 2023-09-05 DIAGNOSIS — Z13.1 SCREENING FOR DIABETES MELLITUS: ICD-10-CM

## 2023-09-05 DIAGNOSIS — Z00.00 ROUTINE GENERAL MEDICAL EXAMINATION AT A HEALTH CARE FACILITY: Primary | ICD-10-CM

## 2023-09-05 DIAGNOSIS — Z13.6 CARDIOVASCULAR SCREENING; LDL GOAL LESS THAN 130: ICD-10-CM

## 2023-09-05 DIAGNOSIS — Z12.5 SCREENING FOR PROSTATE CANCER: ICD-10-CM

## 2023-09-05 DIAGNOSIS — Z12.11 SCREEN FOR COLON CANCER: ICD-10-CM

## 2023-09-05 LAB
ANION GAP SERPL CALCULATED.3IONS-SCNC: 11 MMOL/L (ref 7–15)
BUN SERPL-MCNC: 11.2 MG/DL (ref 6–20)
CALCIUM SERPL-MCNC: 9.8 MG/DL (ref 8.6–10)
CHLORIDE SERPL-SCNC: 105 MMOL/L (ref 98–107)
CHOLEST SERPL-MCNC: 158 MG/DL
CREAT SERPL-MCNC: 0.93 MG/DL (ref 0.67–1.17)
DEPRECATED HCO3 PLAS-SCNC: 25 MMOL/L (ref 22–29)
GFR SERPL CREATININE-BSD FRML MDRD: >90 ML/MIN/1.73M2
GLUCOSE SERPL-MCNC: 89 MG/DL (ref 70–99)
HDLC SERPL-MCNC: 44 MG/DL
HGB BLD-MCNC: 16.5 G/DL (ref 13.3–17.7)
LDLC SERPL CALC-MCNC: 89 MG/DL
NONHDLC SERPL-MCNC: 114 MG/DL
POTASSIUM SERPL-SCNC: 4 MMOL/L (ref 3.4–5.3)
PSA SERPL DL<=0.01 NG/ML-MCNC: 0.67 NG/ML (ref 0–3.5)
SODIUM SERPL-SCNC: 141 MMOL/L (ref 136–145)
TRIGL SERPL-MCNC: 125 MG/DL

## 2023-09-05 PROCEDURE — 85018 HEMOGLOBIN: CPT | Performed by: INTERNAL MEDICINE

## 2023-09-05 PROCEDURE — G0103 PSA SCREENING: HCPCS | Performed by: INTERNAL MEDICINE

## 2023-09-05 PROCEDURE — 36415 COLL VENOUS BLD VENIPUNCTURE: CPT | Performed by: INTERNAL MEDICINE

## 2023-09-05 PROCEDURE — 80048 BASIC METABOLIC PNL TOTAL CA: CPT | Performed by: INTERNAL MEDICINE

## 2023-09-05 PROCEDURE — 83036 HEMOGLOBIN GLYCOSYLATED A1C: CPT | Performed by: INTERNAL MEDICINE

## 2023-09-05 PROCEDURE — 99396 PREV VISIT EST AGE 40-64: CPT | Performed by: INTERNAL MEDICINE

## 2023-09-05 PROCEDURE — 80061 LIPID PANEL: CPT | Performed by: INTERNAL MEDICINE

## 2023-09-05 ASSESSMENT — ENCOUNTER SYMPTOMS
CHILLS: 0
PALPITATIONS: 0
WEAKNESS: 0
ARTHRALGIAS: 0
CONSTIPATION: 0
MYALGIAS: 0
DIARRHEA: 0
HEMATOCHEZIA: 0
DIZZINESS: 0
JOINT SWELLING: 0
HEARTBURN: 0
PARESTHESIAS: 0
DYSURIA: 0
HEMATURIA: 0
SORE THROAT: 0
NAUSEA: 0
HEADACHES: 0
EYE PAIN: 0
FEVER: 0
FREQUENCY: 0
COUGH: 0
NERVOUS/ANXIOUS: 0
SHORTNESS OF BREATH: 0
ABDOMINAL PAIN: 0

## 2023-09-05 ASSESSMENT — PAIN SCALES - GENERAL: PAINLEVEL: NO PAIN (0)

## 2023-09-05 NOTE — PATIENT INSTRUCTIONS
" Colonoscopy next due 2024 due tot he precancerous polyp remove din 2019.      Covid vaccines are now recommended annually.  Get the most updated Covid vaccine when it becomes available, consider getting this at the same time as the annual influenza vaccine.         5 GOALS TO PREVENT VASCULAR DISEASE:     1.  Aggressive blood pressure control, under 130/80 ideally.  Using medications if needed.    Your blood pressure is under good control    BP Readings from Last 4 Encounters:   09/05/23 138/82   07/01/23 (!) 145/89   03/23/22 138/84   03/18/19 117/81       2.  Aggressive LDL cholesterol (\"bad cholesterol\") lowering as indicated.    Your goal is an LDL under 130 for sure, preferably under 100.  (If you have diabetes or previous vascular disease, the the LDL goals would be under 100 for sure, preferably under 70.)    New guidelines identify four high-risk groups who could benefit from statins:   *people with pre-existing heart disease, such as those who have had a heart attack;   *people ages 40 to 75 who have diabetes of any type  *patients ages 40 to 75 with at least a 7.5% risk of developing cardiovascular disease over the next decade, according to a formula described in the guidelines  *patients with the sort of super-high cholesterol that sometimes runs in families, as evidenced by an LDL of 190 milligrams per deciliter or higher    Your cholesterol levels are well controlled.    Recent Labs   Lab Test 03/23/22  1053 02/23/19  1031   CHOL 135 147   HDL 51 42   LDL 63 90   TRIG 107 75       3.  Aggressive diabetic prevention, screening and/or management.      You do not have diabetes as of the most recent blood tests.     4.  No smoking    5.  Consider daily preventative aspirin over age 50 if you have enough cardiac risk factors to place you at higher risk for the presence of vascular disease.    If you have any reason not to take aspirin such easy bruising or bleeding, stomach problems, other anticoagulant " medications, or any other side effects, then you should not take Aspirin.     --Based on your current cardiac disease risk profile and/or age over 75, you do NOT need to take daily preventative aspirin.          Preventive Health Recommendations  Male Ages 45 - 64    Yearly exam:             See your health care provider every year in order to  o   Review health changes.   o   Discuss preventive care.    o   Review your medicines if your doctor has prescribed any.   Continue to review and reassess the management of any ongoing chronic medical conditions  Have a cholesterol test every 5 years, or more frequently if you are at risk for high cholesterol/heart disease.   Have a diabetes test (fasting glucose) at least every three years. If you are at risk for diabetes, you should have this test more often.   Regular colon cancer screening starting age 45 with either a colonoscopy, or stool test (either Cologuard every 3 years or yearly FIT stool test from ).  The intervals for colon cancer screening will be determined by family history and prior colon cancer screening results.   Routine prostate cancer screening with a PSA blood test every 1-2 years.   While the PSA blood test is not a direct cancer screening blood test, it detects inflammation within in the prostate, which could indicate possible cancer.  If the test is abnormal, you do not necessarily have prostate cancer, but would need further evaluation.    You should be tested each year for STDs (sexually transmitted diseases), if you re at risk.     Shots:   Get a flu shot each year.   Covid vaccines are now recommended annually.  Get the most updated Covid vaccine when it becomes available, consider getting this at the same time as the annual influenza vaccine.   Get a tetanus shot every 10 years.  Everyone over age 50 should strongly consider the Shingrix shingles vaccine to give you the best chance of avoiding future shingles infection (as many as 1 and 3  "adults over age 50 may develop this condition in their lifetime).    Investigate the cost and coverage for Shingrix shingles vaccines with a pharmacist at a pharmacy.  They can tell you the coverage and cost and then give it to you if the price is acceptable.    In case your insurance does not cover a Shingrix shingles vaccine, it is cheaper to receive this shingles vaccine from a pharmacist in a pharmacy rather than in our clinic.    At this time, you only need the 2 Shingrix vaccines and then you are done.       Nutrition:  Eat at least 5 servings of fruits and vegetables daily.   Eat whole-grain bread, whole-wheat pasta and brown rice instead of white grains and rice.   Talk to your provider about Calcium and Vitamin D.        --Good Grains:  Oats, brown rice, Quinoa (these do not raise the blood sugar as much)     --Bad grains:  Anything made from wheat or white rice     (because these raise the blood sugars significantly, and the possible gluten issue from wheat for some people).      --Proteins:  Aim for \"lean proteins\" including chicken, fish, seafood, pork, turkey, and eggs (in moderation); Eat red meat only occasionally    Lifestyle  Exercise for at least 150 minutes a week (30 minutes a day, 5 days a week). This will help you control your weight and prevent disease.   Limit alcohol to one drink per day.   No smoking.   Wear sunscreen to prevent skin cancer.   See your dentist every six months for an exam and cleaning.   See your eye doctor every 1 to 2 years.      "

## 2023-09-06 LAB — HBA1C MFR BLD: 5.2 % (ref 0–5.6)

## 2023-11-14 ENCOUNTER — TELEPHONE (OUTPATIENT)
Dept: GASTROENTEROLOGY | Facility: CLINIC | Age: 58
End: 2023-11-14
Payer: COMMERCIAL

## 2023-11-14 NOTE — TELEPHONE ENCOUNTER
"Endoscopy Scheduling Screen    Have you had a positive Covid test in the last 14 days?  No    Are you active on MyChart?   Yes    What insurance is in the chart?  Other:  [employer]    Ordering/Referring Provider:     Isai Guevara MD in  INTERNAL MEDICINE      (If ordering provider performs procedure, schedule with ordering provider unless otherwise instructed. )    BMI: Estimated body mass index is 24 kg/m  as calculated from the following:    Height as of 9/5/23: 1.803 m (5' 11\").    Weight as of 9/5/23: 78.1 kg (172 lb 1.6 oz).     Sedation Ordered  moderate sedation.   If patient BMI > 50 do not schedule in [facility].    If patient BMI > 45 do not schedule at [facility].    Are you taking methadone or Suboxone?  No    Are you taking any prescription medications for pain 3 or more times per week?   No    Do you have a history of malignant hyperthermia or adverse reaction to anesthesia?  No    (Females) Are you currently pregnant?   No     Have you been diagnosed or told you have pulmonary hypertension?   No    Do you have an LVAD?  No    Have you been told you have moderate to severe sleep apnea?  No    Have you been told you have COPD, asthma, or any other lung disease?  No    Do you have any heart conditions?  No     Have you ever had an organ transplant?   No    Have you ever had or are you awaiting a heart or lung transplant?   No    Have you had a stroke or transient ischemic attack (TIA aka \"mini stroke\" in the last 6 months?   No    Have you been diagnosed with or been told you have cirrhosis of the liver?   No    Are you currently on dialysis?   No    Do you need assistance transferring?   No    BMI: Estimated body mass index is 24 kg/m  as calculated from the following:    Height as of 9/5/23: 1.803 m (5' 11\").    Weight as of 9/5/23: 78.1 kg (172 lb 1.6 oz).     Is patients BMI > 40 and scheduling location [facility]?  No    Do you take an injectable medication for weight loss or diabetes (excluding " insulin)?  No    Do you take the medication Naltrexone?  No    Do you take blood thinners?  No       Prep   Are you currently on dialysis or do you have chronic kidney disease?  No    Do you have a diagnosis of diabetes?  No    Do you have a diagnosis of cystic fibrosis (CF)?  No    On a regular basis do you go 3 -5 days between bowel movements?  No    BMI > 40?  No    Preferred Pharmacy:    [facility][id number] [address] [place] [address]  Phone: [phone] Fax: [phone]      Final Scheduling Details   Colonoscopy prep sent?  Standard MiraLAX    Procedure scheduled  Colonoscopy    Surgeon:  Nidhi     Date of procedure:  03/185/2024     Pre-OP / PAC:   No - Not required for this site.    Location  [facility] - Patient preference.    Sedation   Moderate Sedation - Per order.      Patient Reminders:   You will receive a call from a Nurse to review instructions and health history.  This assessment must be completed prior to your procedure.  Failure to complete the Nurse assessment may result in the procedure being cancelled.      On the day of your procedure, please designate an adult(s) who can drive you home stay with you for the next 24 hours. The medicines used in the exam will make you sleepy. You will not be able to drive.      You cannot take public transportation, ride share services, or non-medical taxi service without a responsible caregiver.  Medical transport services are allowed with the requirement that a responsible caregiver will receive you at your destination.  We require that drivers and caregivers are confirmed prior to your procedure.

## 2024-02-19 ENCOUNTER — TELEPHONE (OUTPATIENT)
Dept: GASTROENTEROLOGY | Facility: CLINIC | Age: 59
End: 2024-02-19
Payer: COMMERCIAL

## 2024-02-19 NOTE — TELEPHONE ENCOUNTER
Caller:     Reason for Reschedule/Cancellation   (please be detailed, any staff messages or encounters to note?): WORK      Prior to reschedule please review:  Ordering Provider:     ZOILA BORJA     Sedation Determined: CS  Does patient have any ASC Exclusions, please identify?:       Notes on Cancelled Procedure:  Procedure: Lower Endoscopy [Colonoscopy]   Date: 3/18  Location: [facility]; [address] [place] [address]   Surgeon: LUIS FELIPE      Rescheduled: Yes,   Procedure: Lower Endoscopy [Colonoscopy]    Date: 5/20   Location: [facility]; [address] [place] [address]    Surgeon: LUIS FELIPE   Sedation Level Scheduled  CS,  Reason for Sedation Level ORDER   Prep/Instructions updated and sent: Y       Did you cancel or rescheduled an E[facility] procedure? No.

## 2024-05-08 ENCOUNTER — TELEPHONE (OUTPATIENT)
Dept: GASTROENTEROLOGY | Facility: CLINIC | Age: 59
End: 2024-05-08
Payer: COMMERCIAL

## 2024-05-08 NOTE — TELEPHONE ENCOUNTER
Pre visit planning completed.      Procedure details:    Patient scheduled for Colonoscopy  on 5.20.2024.     Arrival time: 0845. Procedure time 0930    Facility location: [facility]; [address] [place] [address] [place] [address]. Check in location: 1st Floor [facility].     Sedation type: Conscious sedation     Pre op exam needed? N/A    Indication for procedure: screening colonoscopy      Chart review:     Electronic implanted devices? No    Recent diagnosis of diverticulitis within the last 6 weeks? No    Diabetic? No      Medication review:    Anticoagulants? No    NSAIDS? No NSAID medications per patient's medication list.  RN will verify with pre-assessment call.    Other medication HOLDING recommendations:  N/A      Prep for procedure:     Bowel prep recommendation: Standard Miralax  Due to: standard bowel prep.    Prep instructions sent via [employer] by CRC team.         Yvette Dawn RN  Endoscopy Procedure Pre Assessment RN  [phone] option 4

## 2024-05-08 NOTE — TELEPHONE ENCOUNTER
Attempted to contact patient in order to complete pre assessment questions.     Patient is unable to talk at this time and will callback tomorrow.    Yvette Dawn RN

## 2024-05-09 NOTE — TELEPHONE ENCOUNTER
Pre assessment completed for upcoming procedure.   (Please see previous telephone encounter notes for complete details)    Patient  returned call.       Procedure details:    Arrival time and facility location reviewed.    Pre op exam needed? N/A    Designated  policy reviewed. Instructed to have someone stay 6  hours post procedure.       Medication review:    Medications reviewed. Please see supporting documentation below. Holding recommendations discussed (if applicable).       Prep for procedure:     Procedure prep instructions reviewed.        Any additional information needed:  N/A      Patient  verbalized understanding and had no questions or concerns at this time.      Betty Christensen RN  Endoscopy Procedure Pre Assessment RN  [phone] option 4

## 2024-05-20 ENCOUNTER — PATIENT OUTREACH (OUTPATIENT)
Dept: GASTROENTEROLOGY | Facility: CLINIC | Age: 59
End: 2024-05-20
Payer: COMMERCIAL

## 2024-05-20 ENCOUNTER — HOSPITAL ENCOUNTER (OUTPATIENT)
Facility: CLINIC | Age: 59
Discharge: HOME | End: 2024-05-20
Attending: COLON & RECTAL SURGERY | Admitting: COLON & RECTAL SURGERY
Payer: COMMERCIAL

## 2024-05-20 VITALS
OXYGEN SATURATION: 95 % | RESPIRATION RATE: 14 BRPM | DIASTOLIC BLOOD PRESSURE: 83 MMHG | HEART RATE: 57 BPM | SYSTOLIC BLOOD PRESSURE: 115 MMHG

## 2024-05-20 LAB — COLONOSCOPY: NORMAL

## 2024-05-20 PROCEDURE — 250N000011 HC RX IP 250 OP 636: Performed by: COLON & RECTAL SURGERY

## 2024-05-20 PROCEDURE — G0105 COLORECTAL SCRN; HI RISK IND: HCPCS | Performed by: COLON & RECTAL SURGERY

## 2024-05-20 PROCEDURE — 45378 DIAGNOSTIC COLONOSCOPY: CPT | Performed by: COLON & RECTAL SURGERY

## 2024-05-20 PROCEDURE — G0500 MOD SEDAT ENDO SERVICE >5YRS: HCPCS | Performed by: COLON & RECTAL SURGERY

## 2024-05-20 RX ORDER — ONDANSETRON 2 MG/ML
4 INJECTION INTRAMUSCULAR; INTRAVENOUS
Status: DISCONTINUED | OUTPATIENT
Start: 2024-05-20 | End: 2024-05-20 | Stop reason: HOSPADM

## 2024-05-20 RX ORDER — FENTANYL CITRATE 50 UG/ML
INJECTION, SOLUTION INTRAMUSCULAR; INTRAVENOUS PRN
Status: DISCONTINUED | OUTPATIENT
Start: 2024-05-20 | End: 2024-05-20 | Stop reason: HOSPADM

## 2024-05-20 RX ORDER — LIDOCAINE 40 MG/G
CREAM TOPICAL
Status: DISCONTINUED | OUTPATIENT
Start: 2024-05-20 | End: 2024-05-20 | Stop reason: HOSPADM

## 2024-05-20 ASSESSMENT — ACTIVITIES OF DAILY LIVING (ADL)
ADLS_ACUITY_SCORE: 35
ADLS_ACUITY_SCORE: 35

## 2024-05-20 NOTE — H&P
Colon & Rectal Surgery History and Physical  Pre-Endoscopy Procedure Note    History of Present Illness   I have been asked by Dr. Guevara to evaluate this 58 year old male for colorectal polyp surveillance. He currently denies any abdominal pain, weight loss, bleeding per rectum, or recent change in bowel habits.    Past Medical History  Diagnosis Date    Kidney stones 1990, 1997    2 episodes kidney stones       Past Surgical History  Procedure Laterality Date    GENITOURINARY SURGERY      kidney stone lithotripsy        Medications  No medications prior to admission.       Allergies   No Known Allergies     Family History   Family history includes Alzheimer Disease (age of onset: 65) in his mother; Cancer (age of onset: 70) in his father; Cerebrovascular Disease (age of onset: 68) in his father; Coronary Artery Disease Early Onset (age of onset: 59) in his mother; Crohn's Disease (age of onset: 25) in his brother; Hyperlipidemia in his father and mother; Hypertension in his father.     Social History    He reports that he has quit smoking. His smoking use included cigarettes. He has a 15 pack-year smoking history. He has never used smokeless tobacco. He reports current alcohol use. He reports that he does not use drugs.    Review of Systems   Constitutional:  No fever, weight change or fatigue.    Eyes:     No dry eyes or vision changes.   Ears/Nose/Throat/Neck:  No oral ulcers, sore throat or voice change.    Cardiovascular:   No palpitations, syncope, angina or edema.   Respiratory:    No chest pain, excessive sleepiness, shortness of breath or hemoptysis.    Gastrointestinal:   No abdominal pain, nausea, vomiting, diarrhea or heartburn.    Genitourinary:   No dysuria, hematuria, urinary retention or urinary frequency.   Musculoskeletal:  No joint swelling or arthralgias.    Dermatologic:  No skin rash or other skin changes.   Neurologic:    No focal weakness or numbness. No neuropathy.   Psychiatric:    No  depression, anxiety, suicidal ideation, or paranoid ideation.   Endocrine:   No cold or heat intolerance, polydipsia, hirsutism, change in libido, or flushing.   Hematology/Lymphatic:  No bleeding or lymphadenopathy.    Allergy/Immunology:  No rhinitis or hives.     Physical Exam   Vitals:  Blood pressure 130/94, pulse 52, resp. rate 14, SpO2 98%.    General:  Alert and oriented to person, place and time   Airway: Normal oropharyngeal airway and neck mobility   Lungs:  Clear bilaterally   Heart:  Regular rate and rhythm   Abdomen: Soft, NT, ND, no masses   Extremities: Warm, good capillary refill    ASA Grade: II (mild systemic disease)    Impression: Cleared for use of conscious sedation for colorectal polyp surveillance    Plan: Proceed with colonoscopy     Diana Terrell MD  [facility]  [phone]

## 2024-11-03 ENCOUNTER — HEALTH MAINTENANCE LETTER (OUTPATIENT)
Age: 59
End: 2024-11-03

## 2025-06-16 ENCOUNTER — OFFICE VISIT (OUTPATIENT)
Dept: INTERNAL MEDICINE | Facility: CLINIC | Age: 60
End: 2025-06-16
Payer: COMMERCIAL

## 2025-06-16 VITALS
BODY MASS INDEX: 23.98 KG/M2 | HEART RATE: 51 BPM | DIASTOLIC BLOOD PRESSURE: 70 MMHG | TEMPERATURE: 97.9 F | SYSTOLIC BLOOD PRESSURE: 120 MMHG | WEIGHT: 167.5 LBS | OXYGEN SATURATION: 98 % | HEIGHT: 70 IN

## 2025-06-16 DIAGNOSIS — L30.9 DERMATITIS: ICD-10-CM

## 2025-06-16 DIAGNOSIS — Z12.5 SCREENING FOR PROSTATE CANCER: ICD-10-CM

## 2025-06-16 DIAGNOSIS — Z13.6 CARDIOVASCULAR SCREENING; LDL GOAL LESS THAN 130: ICD-10-CM

## 2025-06-16 DIAGNOSIS — Z00.01 ENCOUNTER FOR ROUTINE ADULT MEDICAL EXAM WITH ABNORMAL FINDINGS: Primary | ICD-10-CM

## 2025-06-16 LAB
ANION GAP SERPL CALCULATED.3IONS-SCNC: 11 MMOL/L (ref 7–15)
BUN SERPL-MCNC: 14.7 MG/DL (ref 6–20)
CALCIUM SERPL-MCNC: 9.7 MG/DL (ref 8.8–10.4)
CHLORIDE SERPL-SCNC: 103 MMOL/L (ref 98–107)
CHOLEST SERPL-MCNC: 151 MG/DL
CREAT SERPL-MCNC: 0.9 MG/DL (ref 0.67–1.17)
EGFRCR SERPLBLD CKD-EPI 2021: >90 ML/MIN/1.73M2
ERYTHROCYTE [DISTWIDTH] IN BLOOD BY AUTOMATED COUNT: 12.5 % (ref 10–15)
FASTING STATUS PATIENT QL REPORTED: YES
FASTING STATUS PATIENT QL REPORTED: YES
GLUCOSE SERPL-MCNC: 91 MG/DL (ref 70–99)
HCO3 SERPL-SCNC: 25 MMOL/L (ref 22–29)
HCT VFR BLD AUTO: 43.8 % (ref 40–53)
HDLC SERPL-MCNC: 43 MG/DL
HGB BLD-MCNC: 15.5 G/DL (ref 13.3–17.7)
LDLC SERPL CALC-MCNC: 84 MG/DL
MCH RBC QN AUTO: 29.6 PG (ref 26.5–33)
MCHC RBC AUTO-ENTMCNC: 35.4 G/DL (ref 31.5–36.5)
MCV RBC AUTO: 84 FL (ref 78–100)
NONHDLC SERPL-MCNC: 108 MG/DL
PLATELET # BLD AUTO: 221 10E3/UL (ref 150–450)
POTASSIUM SERPL-SCNC: 4.1 MMOL/L (ref 3.4–5.3)
PSA SERPL DL<=0.01 NG/ML-MCNC: 1.43 NG/ML (ref 0–3.5)
RBC # BLD AUTO: 5.24 10E6/UL (ref 4.4–5.9)
SODIUM SERPL-SCNC: 139 MMOL/L (ref 135–145)
TRIGL SERPL-MCNC: 118 MG/DL
WBC # BLD AUTO: 9.5 10E3/UL (ref 4–11)

## 2025-06-16 PROCEDURE — 3074F SYST BP LT 130 MM HG: CPT | Performed by: INTERNAL MEDICINE

## 2025-06-16 PROCEDURE — 1126F AMNT PAIN NOTED NONE PRSNT: CPT | Performed by: INTERNAL MEDICINE

## 2025-06-16 PROCEDURE — 3048F LDL-C <100 MG/DL: CPT | Performed by: INTERNAL MEDICINE

## 2025-06-16 PROCEDURE — 80048 BASIC METABOLIC PNL TOTAL CA: CPT | Performed by: INTERNAL MEDICINE

## 2025-06-16 PROCEDURE — G0103 PSA SCREENING: HCPCS | Performed by: INTERNAL MEDICINE

## 2025-06-16 PROCEDURE — 36415 COLL VENOUS BLD VENIPUNCTURE: CPT | Performed by: INTERNAL MEDICINE

## 2025-06-16 PROCEDURE — 3078F DIAST BP <80 MM HG: CPT | Performed by: INTERNAL MEDICINE

## 2025-06-16 PROCEDURE — 99396 PREV VISIT EST AGE 40-64: CPT | Performed by: INTERNAL MEDICINE

## 2025-06-16 PROCEDURE — 85027 COMPLETE CBC AUTOMATED: CPT | Performed by: INTERNAL MEDICINE

## 2025-06-16 PROCEDURE — 80061 LIPID PANEL: CPT | Performed by: INTERNAL MEDICINE

## 2025-06-16 RX ORDER — TRIAMCINOLONE ACETONIDE 5 MG/G
OINTMENT TOPICAL
Qty: 15 G | Refills: 0 | Status: SHIPPED | OUTPATIENT
Start: 2025-06-16

## 2025-06-16 SDOH — HEALTH STABILITY: PHYSICAL HEALTH: ON AVERAGE, HOW MANY MINUTES DO YOU ENGAGE IN EXERCISE AT THIS LEVEL?: 60 MIN

## 2025-06-16 SDOH — HEALTH STABILITY: PHYSICAL HEALTH: ON AVERAGE, HOW MANY DAYS PER WEEK DO YOU ENGAGE IN MODERATE TO STRENUOUS EXERCISE (LIKE A BRISK WALK)?: 4 DAYS

## 2025-06-16 ASSESSMENT — SOCIAL DETERMINANTS OF HEALTH (SDOH): HOW OFTEN DO YOU GET TOGETHER WITH FRIENDS OR RELATIVES?: MORE THAN THREE TIMES A WEEK

## 2025-06-16 ASSESSMENT — PAIN SCALES - GENERAL: PAINLEVEL_OUTOF10: NO PAIN (0)

## 2025-06-16 NOTE — PATIENT INSTRUCTIONS
"   Dermatitis:  Apply sparingly once or twice per day as needed to affected area until the skin is better, then stop; REPEAT AS NEEDED           5 GOALS TO PREVENT VASCULAR DISEASE:     1.  Aggressive blood pressure control, under 130/80 ideally.  Using medications if needed.    Your blood pressure is under good control    BP Readings from Last 4 Encounters:   06/16/25 120/70   05/20/24 115/83   09/05/23 138/82   07/01/23 (!) 145/89       2.  Aggressive LDL cholesterol (\"bad cholesterol\") lowering as indicated.    Your goal is an LDL under 130 for sure, preferably under 100.  (If you have diabetes or previous vascular disease, the the LDL goals would be under 100 for sure, preferably under 70.)    New guidelines identify four high-risk groups who could benefit from statins:   *people with pre-existing heart disease, such as those who have had a heart attack;   *people ages 40 to 75 who have diabetes of any type  *patients ages 40 to 75 with at least a 7.5% risk of developing cardiovascular disease over the next decade, according to a formula described in the guidelines  *patients with the sort of super-high cholesterol that sometimes runs in families, as evidenced by an LDL of 190 milligrams per deciliter or higher    Your cholesterol levels are well controlled.    Recent Labs   Lab Test 09/05/23  1609 03/23/22  1053   CHOL 158 135   HDL 44 51   LDL 89 63   TRIG 125 107       --LDL (\"bad\" cholesterol): normal under 130, ideal under 100.  Best way to lower this is through better food choices ( lower fats, etc.), medication may be considered if indicated  --HDL (\"good\") cholesterol: (normal above 40 for men, above 50 for women).  Best way to improve the HDL level is through regular physical exercise.  There are no medications to raise HDL levels.   --Triglycerides (desirable under 150): triglycerides are more about metabolic issues, best way to improve this is through better diet choices, emphasizing reducing the " "intake of \"simple carbohydrates\" (e.g. White bread, white rice, pasta, noodles, potatoes, snack foods, regular soda, juices (except fresh squeezed), cakes, cookies, candy, etc.) as best possible. Medications may be considered for triglyceride levels routinely above 400.    3.  Aggressive diabetic prevention, screening and/or management.      You do not have diabetes as of the most recent blood tests.     4.  No smoking    5.  Consider daily preventative aspirin over age 50 if you have enough cardiac risk factors to place you at higher risk for the presence of vascular disease.    If you have any reason not to take aspirin such easy bruising or bleeding, stomach problems, other anticoagulant medications, or any other side effects, then you should not take Aspirin.     --Based on your current cardiac disease risk profile and/or age over 75, you do NOT need to take daily preventative aspirin.        Preventive Health Recommendations  Male Ages 45 - 64    Yearly exam:             See your health care provider every year in order to  o   Review health changes.   o   Discuss preventive care.    o   Review your medicines if your doctor has prescribed any.   Continue to review and reassess the management of any ongoing chronic medical conditions  Have a cholesterol test every 5 years, or more frequently if you are at risk for high cholesterol/heart disease.   Have a diabetes test (fasting glucose) at least every three years. If you are at risk for diabetes, you should have this test more often.   Regular colon cancer screening starting age 45 with either a colonoscopy, or stool test (either Cologuard every 3 years or yearly FIT stool test from us).  The intervals for colon cancer screening will be determined by family history and prior colon cancer screening results.   Routine prostate cancer screening with a PSA blood test every 1-2 years.   While the PSA blood test is not a direct cancer screening blood test, it detects " "inflammation within in the prostate, which could indicate possible cancer.  If the test is abnormal, you do not necessarily have prostate cancer, but would need further evaluation.    You should be tested each year for STDs (sexually transmitted diseases), if you re at risk.     Shots:   Get a flu shot each year.   Covid vaccines are now recommended annually.  Get the most updated Covid vaccine when it becomes available, consider getting this at the same time as the annual influenza vaccine.   Get a tetanus shot every 10 years.  Everyone over age 50 should strongly consider the Shingrix shingles vaccine to give you the best chance of avoiding future shingles infection (as many as 1 and 3 adults over age 50 may develop this condition in their lifetime).    Investigate the cost and coverage for Shingrix shingles vaccines with a pharmacist at a pharmacy.  They can tell you the coverage and cost and then give it to you if the price is acceptable.    In case your insurance does not cover a Shingrix shingles vaccine, it is cheaper to receive this shingles vaccine from a pharmacist in a pharmacy rather than in our clinic.    At this time, you only need the 2 Shingrix vaccines and then you are done.       Nutrition:  Eat at least 5 servings of fruits and vegetables daily.   Eat whole-grain bread, whole-wheat pasta and brown rice instead of white grains and rice.   Talk to your provider about Calcium and Vitamin D.        --Good Grains:  Oats, brown rice, Quinoa (these do not raise the blood sugar as much)     --Bad grains:  Anything made from wheat or white rice     (because these raise the blood sugars significantly, and the possible gluten issue from wheat for some people).      --Proteins:  Aim for \"lean proteins\" including chicken, fish, seafood, pork, turkey, and eggs (in moderation); Eat red meat only occasionally    Lifestyle  Exercise for at least 150 minutes a week (30 minutes a day, 5 days a week). This will help " you control your weight and prevent disease.   Limit alcohol to one drink per day.   No smoking.   Wear sunscreen to prevent skin cancer.   See your dentist every six months for an exam and cleaning.   See your eye doctor every 1 to 2 years.

## 2025-06-16 NOTE — PROGRESS NOTES
Preventive Care Visit  [facility]  Isai Guevara MD, Internal Medicine  Jun 16, 2025      Assessment & Plan     (Z00.01) Encounter for routine adult medical exam with abnormal findings  (primary encounter diagnosis)  Comment: Discussed cardiac disease risk factor modification including screening, preventing, and treating hypertension, elevated lipids, diabetes, and smoking cessation.    Discussed age appropriate cancer screening recommendations as dictated by age group and past medical history.    Recommended making better food choices as often as possible, including lower carb, lower fat, lower salt diet and moderation in any alcohol intake.    Recommended maintaining regular physical activity/exercise throughout their lifetime.  Recommended safety and injury prevention (i.e. seatbelt use, safety equipment like helmets when biking, etc).    Reviewed preventive health counseling, as reflected in patient instructions       Regular exercise       Healthy diet/nutrition       Vision screening       Hearing screening       Immunizations  Routine vaccine counseling provided.           Colorectal cancer screening       Consider prostate cancer screening (age 55-69)   Plan:     (L30.9) Dermatitis  Comment:   Plan: triamcinolone (KENALOG) 0.5 % external ointment            (Z13.6) CARDIOVASCULAR SCREENING; LDL GOAL LESS THAN 130  Comment: Discussed cardiac disease risk factors and cardiac disease risk factor modification.   Plan: CBC with platelets, Basic metabolic panel,         Lipid panel reflex to direct LDL Fasting            (Z12.5) Screening for prostate cancer  Comment: Discussed prostate cancer screening and PSA blood test.  Discussed that an elevated PSA blood test can be caused by things other than prostate cancer, including infection/inflammation, BPH, and recent sexual activity; and that elevated PSA blood test may require further investigation with a urologist   Plan: PSA,  screen               Patient has been advised of split billing requirements and indicates understanding: Yes        Nicotine/Tobacco Cessation  He reports that he has been smoking cigarettes. He has a 15 pack-year smoking history. He has never used smokeless tobacco.  Nicotine/Tobacco Cessation Plan  Information offered: Patient not interested at this time      Counseling  Appropriate preventive services were addressed with this patient via screening, questionnaire, or discussion as appropriate for fall prevention, nutrition, physical activity, Tobacco-use cessation, social engagement, weight loss and cognition.  Checklist reviewing preventive services available has been given to the patient.  Reviewed patient's diet, addressing concerns and/or questions.   The patient reports drinking more than 3 alcoholic drinks per day and/or more than 7 drhnks per week. The patient was counseled and given information about possible harmful effects of excessive alcohol intake.          Khadar Rios is a 59 year old, presenting for the following:  Physical        6/16/2025     4:14 PM   Additional Questions   Roomed by Gail ROUSE        Advance Care Planning    Discussed advance care planning with patient; however, patient declined at this time.        6/16/2025   General Health   How would you rate your overall physical health? Good   Feel stress (tense, anxious, or unable to sleep) Not at all         6/16/2025   Nutrition   Three or more servings of calcium each day? (!) I DON'T KNOW   Diet: Regular (no restrictions)   How many servings of fruit and vegetables per day? (!) 0-1   How many sweetened beverages each day? (!) 2         6/16/2025   Exercise   Days per week of moderate/strenous exercise 4 days   Average minutes spent exercising at this level 60 min         6/16/2025   Social Factors   Frequency of gathering with friends or relatives More than three times a week   Worry food won't last until get  money to buy more No   Food not last or not have enough money for food? No   Do you have housing? (Housing is defined as stable permanent housing and does not include staying outside in a car, in a tent, in an abandoned building, in an overnight shelter, or couch-surfing.) Yes   Are you worried about losing your housing? No   Lack of transportation? No   Unable to get utilities (heat,electricity)? No         6/16/2025   Fall Risk   Fallen 2 or more times in the past year? No   Trouble with walking or balance? No          6/16/2025   Dental   Dentist two times every year? Yes         Today's PHQ-2 Score:       6/16/2025     5:49 AM   PHQ-2 ( 1999 [employer])   Q1: Little interest or pleasure in doing things 0   Q2: Feeling down, depressed or hopeless 0   PHQ-2 Score 0    Q1: Little interest or pleasure in doing things Not at all   Q2: Feeling down, depressed or hopeless Not at all   PHQ-2 Score 0       Patient-reported           6/16/2025   Substance Use   Alcohol more than 3/day or more than 7/wk Yes   How often do you have a drink containing alcohol 2 to 3 times a week   How many alcohol drinks on typical day 3 or 4   How often do you have 5+ drinks at one occasion Less than monthly   Audit 2/3 Score 2   How often not able to stop drinking once started Never   How often failed to do what normally expected Never   How often needed first drink in am after a heavy drinking session Never   How often feeling of guilt or remorse after drinking Never   How often unable to remember what happened the night before Never   Have you or someone else been injured because of your drinking No   Has anyone been concerned or suggested you cut down on drinking No   TOTAL SCORE - AUDIT 5   Do you use any other substances recreationally? (!) CANNABIS PRODUCTS     Social History     Tobacco Use    Smoking status: Some Days     Current packs/day: 1.00     Average packs/day: 1 pack/day for 15.0 years (15.0 ttl pk-yrs)     Types: Cigarettes     "Smokeless tobacco: Never    Tobacco comments:     2x week    Vaping Use    Vaping status: Never Used   Substance Use Topics    Alcohol use: Yes     Comment: 3-4 beers/week    Drug use: No           6/16/2025   STI Screening   New sexual partner(s) since last STI/HIV test? No   Last PSA:   PSA   Date Value Ref Range Status   02/23/2019 1.32 0 - 4 ug/L Final     Comment:     Assay Method:  Chemiluminescence using Siemens Vista analyzer     Prostate Specific Antigen Screen   Date Value Ref Range Status   06/16/2025 1.43 0.00 - 3.50 ng/mL Final   03/23/2022 0.96 0.00 - 4.00 ug/L Final     ASCVD Risk   The 10-year ASCVD risk score (Fish HALLMAN, et al., 2019) is: 8.8%    Values used to calculate the score:      Age: 59 years      Sex: Male      Is Non- : No      Diabetic: No      Tobacco smoker: Yes      Systolic Blood Pressure: 120 mmHg      Is BP treated: No      HDL Cholesterol: 43 mg/dL      Total Cholesterol: 151 mg/dL           Reviewed and updated as needed this visit by Provider    Allergies  Meds  Problems  Med Hx   Fam Hx            **I reviewed the information recorded in the patient's EPIC chart (including but not limited to medical history, surgical history, family history, problem list, medication list, and allergy list) and updated the information as indicated based on the patients reported information.         Review of Systems  Constitutional, neuro, ENT, endocrine, pulmonary, cardiac, gastrointestinal, genitourinary, musculoskeletal, integument and psychiatric systems are negative, except as otherwise noted.     Objective    Exam  /70   Pulse 51   Temp 97.9  F (36.6  C) (Temporal)   Ht 1.778 m (5' 10\")   Wt 76 kg (167 lb 8 oz)   SpO2 98%   BMI 24.03 kg/m     Estimated body mass index is 24.03 kg/m  as calculated from the following:    Height as of this encounter: 1.778 m (5' 10\").    Weight as of this encounter: 76 kg (167 lb 8 oz).    Physical " Exam  Physical Exam  Vitals and nursing note reviewed.   Constitutional:       Comments: Moves normally, alert, no apparent distress  HENT:      Head: Normocephalic and atraumatic.      Nose: Nose normal.   Eyes:      Extraocular Movements: Extraocular movements intact.   Cardiovascular:      Rate and Rhythm: Normal rate and regular rhythm.      Heart sounds: Normal heart sounds.   Pulmonary:      Effort: Pulmonary effort is normal.      Breath sounds: Normal breath sounds.   Abdominal:      General: There is no distension.      Palpations: There is no mass.      Tenderness: No abdominal tenderness, no distention.     Bowel sounds: normal  Musculoskeletal:         General: Normal range of motion, moves into the room normal, moves in and out of chair normally.    Skin:     General: Skin is warm and dry.   Neurological:      General: No focal deficit present.      Mental Status: Alert, responds to questions, Speech coherent  Psychiatric:         Mood and Affect: Mood normal.      Results for orders placed or performed in visit on 06/16/25   CBC with platelets     Status: Normal   Result Value Ref Range    WBC Count 9.5 4.0 - 11.0 10e3/uL    RBC Count 5.24 4.40 - 5.90 10e6/uL    Hemoglobin 15.5 13.3 - 17.7 g/dL    Hematocrit 43.8 40.0 - 53.0 %    MCV 84 78 - 100 fL    MCH 29.6 26.5 - 33.0 pg    MCHC 35.4 31.5 - 36.5 g/dL    RDW 12.5 10.0 - 15.0 %    Platelet Count 221 150 - 450 10e3/uL   Basic metabolic panel     Status: None   Result Value Ref Range    Sodium 139 135 - 145 mmol/L    Potassium 4.1 3.4 - 5.3 mmol/L    Chloride 103 98 - 107 mmol/L    Carbon Dioxide (CO2) 25 22 - 29 mmol/L    Anion Gap 11 7 - 15 mmol/L    Urea Nitrogen 14.7 6.0 - 20.0 mg/dL    Creatinine 0.90 0.67 - 1.17 mg/dL    GFR Estimate >90 >60 mL/min/1.73m2    Calcium 9.7 8.8 - 10.4 mg/dL    Glucose 91 70 - 99 mg/dL    Patient Fasting > 8hrs? Yes    Lipid panel reflex to direct LDL Fasting     Status: None   Result Value Ref Range    Cholesterol  151 <200 mg/dL    Triglycerides 118 <150 mg/dL    Direct Measure HDL 43 >=40 mg/dL    LDL Cholesterol Calculated 84 <100 mg/dL    Non HDL Cholesterol 108 <130 mg/dL    Patient Fasting > 8hrs? Yes     Narrative    Cholesterol  Desirable: < 200 mg/dL  Borderline High: 200 - 239 mg/dL  High: >= 240 mg/dL    Triglycerides  Normal: < 150 mg/dL  Borderline High: 150 - 199 mg/dL  High: 200-499 mg/dL  Very High: >= 500 mg/dL    Direct Measure HDL  Female: >= 50 mg/dL   Male: >= 40 mg/dL    LDL Cholesterol  Desirable: < 100 mg/dL  Above Desirable: 100 - 129 mg/dL   Borderline High: 130 - 159 mg/dL   High:  160 - 189 mg/dL   Very High: >= 190 mg/dL    Non HDL Cholesterol  Desirable: < 130 mg/dL  Above Desirable: 130 - 159 mg/dL  Borderline High: 160 - 189 mg/dL  High: 190 - 219 mg/dL  Very High: >= 220 mg/dL   PSA, screen     Status: Normal   Result Value Ref Range    Prostate Specific Antigen Screen 1.43 0.00 - 3.50 ng/mL    Narrative    This result is obtained using the Roche Elecsys total PSA method on the simone e801 immunoassay analyzer, which is an ultrasensitive method. Results obtained with different assay methods or kits cannot be used interchangeably.  This test is intended for initial prostate cancer screening. PSA values exceeding the age-specific limits are suspicious for prostate disease, but additional testing, such as prostate biopsy, is needed to diagnose prostate pathology. The American Cancer Society recommends annual examination with digital rectal examination and serum PSA beginning at age 50 and for men with a life expectancy of at least 10 years after detection of prostate cancer. For men in high-risk groups, such as  Americans or men with a first-degree relative diagnosed at a younger age, testing should begin at a younger age. It is generally recommended that information be provided to patients about the benefits and limitations of testing and treatment so they can make informed decisions.          Signed Electronically by: Isai Guevara MD

## (undated) RX ORDER — FENTANYL CITRATE 50 UG/ML
INJECTION, SOLUTION INTRAMUSCULAR; INTRAVENOUS
Status: DISPENSED
Start: 2024-05-20

## (undated) RX ORDER — FENTANYL CITRATE 50 UG/ML
INJECTION, SOLUTION INTRAMUSCULAR; INTRAVENOUS
Status: DISPENSED
Start: 2019-03-18